# Patient Record
Sex: MALE | Race: WHITE | URBAN - METROPOLITAN AREA
[De-identification: names, ages, dates, MRNs, and addresses within clinical notes are randomized per-mention and may not be internally consistent; named-entity substitution may affect disease eponyms.]

---

## 2017-04-19 ENCOUNTER — HOSPITAL ENCOUNTER (OUTPATIENT)
Dept: ULTRASOUND IMAGING | Facility: CLINIC | Age: 27
Discharge: HOME | End: 2017-04-19
Attending: SURGERY | Admitting: SURGERY
Payer: COMMERCIAL

## 2017-04-19 ENCOUNTER — OFFICE VISIT (OUTPATIENT)
Dept: SURGERY | Facility: CLINIC | Age: 27
End: 2017-04-19
Payer: COMMERCIAL

## 2017-04-19 VITALS
WEIGHT: 215 LBS | HEIGHT: 75 IN | HEART RATE: 57 BPM | DIASTOLIC BLOOD PRESSURE: 89 MMHG | BODY MASS INDEX: 26.73 KG/M2 | SYSTOLIC BLOOD PRESSURE: 144 MMHG

## 2017-04-19 DIAGNOSIS — R19.00 LEFT FLANK MASS: Primary | ICD-10-CM

## 2017-04-19 DIAGNOSIS — R19.00 LEFT FLANK MASS: ICD-10-CM

## 2017-04-19 PROCEDURE — 76705 ECHO EXAM OF ABDOMEN: CPT

## 2017-04-19 PROCEDURE — 99243 OFF/OP CNSLTJ NEW/EST LOW 30: CPT | Performed by: SURGERY

## 2017-04-19 NOTE — LETTER
"[facility]    April 19, 2017      Omar Dockery MD     Sanjiv Kelly MRN# [id number]   YOB: 1990 Age: 26 year old      PCP: Nikki Garcia [phone]     Chief Complaint:  Left flank mass     Pt was seen in consultation from Nikki Garcia.     History of Present Illness: Sanjiv Kelly is a 26 year old male who presented with a bulge near the anterior superior iliac crest. This has been present for months and is intermittently uncomfortable, particularly when he is doing planks. No history of trauma or surgery to the area. The bulges fairly stable, does not come and go. He is here to discuss diagnosis and management.     PMH: Sanjiv Kelly  has no past medical history on file.  PSH: Sanjiv Kelly  has no past surgical history on file.     Home medications and allergies reviewed.     Social History: Sanjiv Kelly  reports that he has never smoked. He does not have any smokeless tobacco history on file.  Family History: Sanjiv Kelly family history is not on file.     ROS: The 10 point Review of Systems is negative other than noted in the HPI.        Physical Exam:  Blood pressure 144/89, pulse 57, height 6' 3\" (1.905 m), weight 215 lb (97.5 kg).  215 lbs 0 oz  tall healthy-appearing gentleman in no distress.  Patient has a pleasant affect and communicates well.   Pupils equal round and reactive to light.   No cervical lymphadenopathy or thyromegaly.   Lung fields clear, breathing comfortably.   Heart normal sinus rhythm. No murmurs rubs or gallops.  Abdomen soft, nontender, nondistended. Vague area of fullness near the left anterior superior iliac crest. No overlying skin lesion. No corresponding mass on the other side.  Skin warm, dry. No obvious rashes or lesions.      Assessment/plan: Healthy young gentleman with a probable lipoma versus hernia near the left hip. I have recommended ultrasound to assess whether this could be a hernia. If the ultrasound suggests " simple lipoma , I think the patient would be fine deferring any more management. I will notify him once The ultrasound is completed.     Obinna Dockery M.D.  [facility]  [phone]

## 2017-04-19 NOTE — MR AVS SNAPSHOT
After Visit Summary   4/19/2017    Sanjiv Kelly    MRN: [id number]           Patient Information     Date Of Birth          1990        Visit Information        Provider Department      4/19/2017 10:00 AM Omar Dockery MD [facility] General Surgery      Today's Diagnoses     Left flank mass    -  1       Follow-ups after your visit        Your next 10 appointments already scheduled     Apr 19, 2017 11:30 AM CDT   US EXTREMITY NON VASCULAR LEFT with SHUS1   [facility] Ultrasound ([facility])    [address] [id number] [phone]           Please bring a list of your medicines (including vitamins, minerals and over-the-counter drugs). Also, tell your doctor about any allergies you may have. Wear comfortable clothes and leave your valuables at home.  You do not need to do anything special to prepare for your exam.  Please call the Imaging Department at your exam site with any questions.              Future tests that were ordered for you today     Open Future Orders        Priority Expected Expires Ordered    US Extremity Non Vascular Left Routine 4/19/2017 4/19/2018 4/19/2017            Who to contact     If you have questions or need follow up information about today's clinic visit or your schedule please contact [facility] directly at [phone].  Normal or non-critical lab and imaging results will be communicated to you by MyChart, letter or phone within 4 business days after the clinic has received the results. If you do not hear from us within 7 days, please contact the clinic through MyChart or phone. If you have a critical or abnormal lab result, we will notify you by phone as soon as possible.  Submit refill requests through [employer] or call your pharmacy and they will forward the refill request to us. Please allow 3 business days for your refill to be completed.        "   Additional Information About Your Visit        MyChart Information     [employer] lets you send messages to your doctor, view your test results, renew your prescriptions, schedule appointments and more. To sign up, go to [email or web address] . Click on \"Log in\" on the left side of the screen, which will take you to the Welcome page. Then click on \"Sign up Now\" on the right side of the page.     You will be asked to enter the access code listed below, as well as some personal information. Please follow the directions to create your username and password.     Your access code is: [email or web address]  Expires: 2017 10:22 AM     Your access code will  in 90 days. If you need help or a new code, please call your [place] clinic or [phone].        Care EveryWhere ID     This is your Care EveryWhere ID. This could be used by other organizations to access your [place] medical records  [id number]        Your Vitals Were     Pulse Height BMI (Body Mass Index)             57 6' 3\" (1.905 m) 26.87 kg/m2          Blood Pressure from Last 3 Encounters:   17 144/89    Weight from Last 3 Encounters:   17 215 lb (97.5 kg)               Primary Care Provider Office Phone # Fax #    Nikki Garcia [phone]       [facility] [address]  [facility] [address]        Thank you!     Thank you for choosing [facility]  for your care. Our goal is always to provide you with excellent care. Hearing back from our patients is one way we can continue to improve our services. Please take a few minutes to complete the written survey that you may receive in the mail after your visit with us. Thank you!             Your Updated Medication List - Protect others around you: Learn how to safely use, store and throw away your medicines at www.disposemymeds.org.          This list is accurate as of: 17 10:22 AM.  Always use your most recent med list.                   " Brand Name Dispense Instructions for use    FLONASE NA          OMEPRAZOLE PO

## 2017-04-19 NOTE — PROGRESS NOTES
"Surgery Consultation, [facility]         Omar Dockery MD    Sanjiv Kelly MRN# [id number]   YOB: 1990 Age: 26 year old     PCP:  Nikki Garcia [phone]    Chief Complaint:   Left flank mass    Pt was seen in consultation from Nikki Garcia.    History of Present Illness:  Sanjiv Kelly is a 26 year old male who presented with a bulge near the anterior superior iliac crest.  This has been present for months and is intermittently uncomfortable, particularly when he is doing planks.   No history of trauma or surgery to the area. The bulges fairly stable, does not come and go. He is here to discuss diagnosis and management.    PMH:  Sanjiv Kelly  has no past medical history on file.  PSH:  Sanjiv Kelly  has no past surgical history on file.    Home medications and allergies reviewed.    Social History:  Sanjiv Kelly  reports that he has never smoked. He does not have any smokeless tobacco history on file.  Family History:  Sanjiv Kelly family history is not on file.    ROS:  The 10 point Review of Systems is negative other than noted in the HPI.      Physical Exam:  Blood pressure 144/89, pulse 57, height 6' 3\" (1.905 m), weight 215 lb (97.5 kg).  215 lbs 0 oz   tall healthy-appearing gentleman in no distress.  Patient has a pleasant affect and communicates well.   Pupils equal round and reactive to light.   No cervical lymphadenopathy or thyromegaly.   Lung fields clear, breathing comfortably.   Heart normal sinus rhythm.  No murmurs rubs or gallops.  Abdomen soft, nontender, nondistended.   Vague area of fullness near the left anterior superior iliac crest. No overlying skin lesion. No corresponding mass on the other side.  Skin warm, dry.  No obvious rashes or lesions.       Assessment/plan:   Healthy young gentleman with a probable lipoma versus hernia near the left hip.  I have recommended ultrasound to assess whether this could be a hernia.  If the ultrasound suggests simple " lipoma , I think the patient would be fine deferring any more management. I will notify him once  The ultrasound is completed.    Obinna Dockery M.D.  [facility]  [phone]    Please route or send letter to:  Primary Care Provider (PCP) and Referring Provider